# Patient Record
Sex: FEMALE
[De-identification: names, ages, dates, MRNs, and addresses within clinical notes are randomized per-mention and may not be internally consistent; named-entity substitution may affect disease eponyms.]

---

## 2019-04-08 ENCOUNTER — TRANSCRIPTION ENCOUNTER (OUTPATIENT)
Age: 24
End: 2019-04-08

## 2022-08-02 ENCOUNTER — NON-APPOINTMENT (OUTPATIENT)
Age: 27
End: 2022-08-02

## 2023-12-13 ENCOUNTER — APPOINTMENT (OUTPATIENT)
Dept: INTERNAL MEDICINE | Facility: CLINIC | Age: 28
End: 2023-12-13
Payer: COMMERCIAL

## 2023-12-13 VITALS
HEART RATE: 111 BPM | WEIGHT: 115.52 LBS | SYSTOLIC BLOOD PRESSURE: 129 MMHG | HEIGHT: 61 IN | OXYGEN SATURATION: 99 % | DIASTOLIC BLOOD PRESSURE: 83 MMHG | BODY MASS INDEX: 21.81 KG/M2 | TEMPERATURE: 98 F

## 2023-12-13 DIAGNOSIS — Z83.3 FAMILY HISTORY OF DIABETES MELLITUS: ICD-10-CM

## 2023-12-13 DIAGNOSIS — J06.9 ACUTE UPPER RESPIRATORY INFECTION, UNSPECIFIED: ICD-10-CM

## 2023-12-13 PROCEDURE — 99203 OFFICE O/P NEW LOW 30 MIN: CPT

## 2023-12-13 RX ORDER — GUAIFENESIN 600 MG/1
600 TABLET, EXTENDED RELEASE ORAL
Qty: 60 | Refills: 0 | Status: ACTIVE | COMMUNITY
Start: 2023-12-13 | End: 1900-01-01

## 2023-12-13 RX ORDER — HYDROXYZINE HYDROCHLORIDE 50 MG/1
TABLET ORAL
Refills: 0 | Status: ACTIVE | COMMUNITY

## 2023-12-13 RX ORDER — FLUTICASONE PROPIONATE 50 UG/1
50 SPRAY, METERED NASAL
Qty: 1 | Refills: 0 | Status: ACTIVE | COMMUNITY
Start: 2023-12-13 | End: 1900-01-01

## 2023-12-13 RX ORDER — NORGESTIMATE AND ETHINYL ESTRADIOL 0.25-0.035
KIT ORAL
Refills: 0 | Status: ACTIVE | COMMUNITY

## 2023-12-13 NOTE — HISTORY OF PRESENT ILLNESS
[de-identified] : 28 F PMH anxie hydroxypam 25 mg as needed prontiocs, estraella    grandfather   since monday, chills, sore throa better. weak. fatigued. telehealth on monday. benztonate cough suppressant. covid rapid test negative.  dryand little bit of phelgm.  no sob, no chest pain ovewr the count cough and cold medication  better, exacerbating factors (affter food)   [facility].  [FreeTextEntry8] : 27 F PMH anxiety presents to establish care and c/o of cough. Pt states on Monday started having chills, sore throat, cough. She was tested for COVID-19 which was negative. She did telehealth urgent care visit for her cough, was prescribed benzonatate pills. She states cough is better but still persistent. States cough is worse at night. States coughing up little bit of sputum, not sure what color. Denies fever, chills, chest pain, SOB, N/V, abdominal pain, headache, palpitations, leg pain, dizziness, weakness. Denies cigarette use or recreational drug use. Drinks alcohol socially.

## 2023-12-13 NOTE — HISTORY OF PRESENT ILLNESS
[de-identified] : 28 F PMH anxie hydroxypam 25 mg as needed prontiocs, estraella    grandfather   since monday, chills, sore throa better. weak. fatigued. telehealth on monday. benztonate cough suppressant. covid rapid test negative.  dryand little bit of phelgm.  no sob, no chest pain ovewr the count cough and cold medication  better, exacerbating factors (affter food)   [facility].  [FreeTextEntry8] : 27 F PMH anxiety presents to establish care and c/o of cough. Pt states on Monday started having chills, sore throat, cough. She was tested for COVID-19 which was negative. She did telehealth urgent care visit for her cough, was prescribed benzonatate pills. She states cough is better but still persistent. States cough is worse at night. States coughing up little bit of sputum, not sure what color. Denies fever, chills, chest pain, SOB, N/V, abdominal pain, headache, palpitations, leg pain, dizziness, weakness. Denies cigarette use or recreational drug use. Drinks alcohol socially.

## 2023-12-13 NOTE — PHYSICAL EXAM
[Normal Outer Ear/Nose] : the outer ears and nose were normal in appearance [Normal Oropharynx] : the oropharynx was normal [Normal] : normal rate, regular rhythm, normal S1 and S2 and no murmur heard

## 2023-12-13 NOTE — HISTORY OF PRESENT ILLNESS
[de-identified] : 28 F PMH anxie hydroxypam 25 mg as needed prontiocs, estraella    grandfather   since monday, chills, sore throa better. weak. fatigued. telehealth on monday. benztonate cough suppressant. covid rapid test negative.  dryand little bit of phelgm.  no sob, no chest pain ovewr the count cough and cold medication  better, exacerbating factors (affter food)   [facility]on[facility].  [FreeTextEntry8] : 27 F PMH anxiety presents to establish care and c/o of cough. Pt states on Monday started having chills, sore throat, cough. She was tested for COVID-19 which was negative. She did telehealth urgent care visit for her cough, was prescribed benzonatate pills. She states cough is better but still persistent. States cough is worse at night. States coughing up little bit of sputum, not sure what color. Denies fever, chills, chest pain, SOB, N/V, abdominal pain, headache, palpitations, leg pain, dizziness, weakness. Denies cigarette use or recreational drug use. Drinks alcohol socially.

## 2023-12-13 NOTE — REVIEW OF SYSTEMS
[Nasal Discharge] : nasal discharge [Postnasal Drip] : postnasal drip [Cough] : cough [Fever] : no fever [Chills] : no chills [Earache] : no earache [Hoarseness] : no hoarseness [Sore Throat] : no sore throat [Chest Pain] : no chest pain [Palpitations] : no palpitations [Lower Ext Edema] : no lower extremity edema [Orthopnea] : no orthopnea [Shortness Of Breath] : no shortness of breath [Wheezing] : no wheezing [Dyspnea on Exertion] : no dyspnea on exertion [Abdominal Pain] : no abdominal pain [Nausea] : no nausea [Vomiting] : no vomiting [Heartburn] : no heartburn

## 2024-03-14 ENCOUNTER — APPOINTMENT (OUTPATIENT)
Dept: INTERNAL MEDICINE | Facility: CLINIC | Age: 29
End: 2024-03-14
Payer: COMMERCIAL

## 2024-03-14 VITALS
HEART RATE: 142 BPM | OXYGEN SATURATION: 99 % | HEIGHT: 61 IN | DIASTOLIC BLOOD PRESSURE: 81 MMHG | SYSTOLIC BLOOD PRESSURE: 114 MMHG | WEIGHT: 115.13 LBS | BODY MASS INDEX: 21.74 KG/M2

## 2024-03-14 DIAGNOSIS — Z86.59 PERSONAL HISTORY OF OTHER MENTAL AND BEHAVIORAL DISORDERS: ICD-10-CM

## 2024-03-14 DIAGNOSIS — Z00.00 ENCOUNTER FOR GENERAL ADULT MEDICAL EXAMINATION W/OUT ABNORMAL FINDINGS: ICD-10-CM

## 2024-03-14 PROCEDURE — 99395 PREV VISIT EST AGE 18-39: CPT

## 2024-03-14 NOTE — ASSESSMENT
[FreeTextEntry1] : #HCM -has not had pap smear, GYN referral provided -UTD vaccines  #Anxiety -hydroxyzine PRN

## 2024-03-14 NOTE — HISTORY OF PRESENT ILLNESS
[FreeTextEntry1] : CPE [de-identified] : 28 F PMH anxiety presents for CPE. Pt feels well, no acute complaints. Denies fever, chills, chest pain, SOB, N/V, abdominal pain, headache, cough, palpitations, leg pain, dizziness, weakness

## 2024-03-14 NOTE — HEALTH RISK ASSESSMENT
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

## 2024-03-15 LAB
25(OH)D3 SERPL-MCNC: 17.6 NG/ML
ALBUMIN SERPL ELPH-MCNC: 4.6 G/DL
ALP BLD-CCNC: 63 U/L
ALT SERPL-CCNC: 16 U/L
ANION GAP SERPL CALC-SCNC: 14 MMOL/L
AST SERPL-CCNC: 19 U/L
BASOPHILS # BLD AUTO: 0.02 K/UL
BASOPHILS NFR BLD AUTO: 0.3 %
BILIRUB SERPL-MCNC: 0.4 MG/DL
BUN SERPL-MCNC: 12 MG/DL
CALCIUM SERPL-MCNC: 10.4 MG/DL
CHLORIDE SERPL-SCNC: 105 MMOL/L
CHOLEST SERPL-MCNC: 185 MG/DL
CO2 SERPL-SCNC: 22 MMOL/L
CREAT SERPL-MCNC: 0.84 MG/DL
EGFR: 98 ML/MIN/1.73M2
EOSINOPHIL # BLD AUTO: 0.09 K/UL
EOSINOPHIL NFR BLD AUTO: 1.2 %
ESTIMATED AVERAGE GLUCOSE: 105 MG/DL
GLUCOSE SERPL-MCNC: 114 MG/DL
HBA1C MFR BLD HPLC: 5.3 %
HCT VFR BLD CALC: 45.6 %
HDLC SERPL-MCNC: 59 MG/DL
HGB BLD-MCNC: 14.1 G/DL
IMM GRANULOCYTES NFR BLD AUTO: 0.1 %
LDLC SERPL CALC-MCNC: 111 MG/DL
LYMPHOCYTES # BLD AUTO: 2.51 K/UL
LYMPHOCYTES NFR BLD AUTO: 34.7 %
MAN DIFF?: NORMAL
MCHC RBC-ENTMCNC: 28.5 PG
MCHC RBC-ENTMCNC: 30.9 GM/DL
MCV RBC AUTO: 92.3 FL
MONOCYTES # BLD AUTO: 0.56 K/UL
MONOCYTES NFR BLD AUTO: 7.7 %
NEUTROPHILS # BLD AUTO: 4.05 K/UL
NEUTROPHILS NFR BLD AUTO: 56 %
NONHDLC SERPL-MCNC: 127 MG/DL
PLATELET # BLD AUTO: 289 K/UL
POTASSIUM SERPL-SCNC: 5.3 MMOL/L
PROT SERPL-MCNC: 8.5 G/DL
RBC # BLD: 4.94 M/UL
RBC # FLD: 13.9 %
SODIUM SERPL-SCNC: 142 MMOL/L
TRIGL SERPL-MCNC: 83 MG/DL
TSH SERPL-ACNC: 4.07 UIU/ML
WBC # FLD AUTO: 7.24 K/UL

## 2024-03-18 ENCOUNTER — TRANSCRIPTION ENCOUNTER (OUTPATIENT)
Age: 29
End: 2024-03-18